# Patient Record
Sex: FEMALE | URBAN - METROPOLITAN AREA
[De-identification: names, ages, dates, MRNs, and addresses within clinical notes are randomized per-mention and may not be internally consistent; named-entity substitution may affect disease eponyms.]

---

## 2017-05-03 ENCOUNTER — RECORDS - HEALTHEAST (OUTPATIENT)
Dept: LAB | Facility: CLINIC | Age: 82
End: 2017-05-03

## 2017-05-04 LAB
ALT SERPL W P-5'-P-CCNC: 42 U/L (ref 0–45)
AST SERPL W P-5'-P-CCNC: 36 U/L (ref 0–40)

## 2017-10-20 ENCOUNTER — AMBULATORY - HEALTHEAST (OUTPATIENT)
Dept: ADMINISTRATIVE | Facility: CLINIC | Age: 82
End: 2017-10-20

## 2017-10-20 RX ORDER — PREDNISONE 10 MG/1
10 TABLET ORAL DAILY
Status: SHIPPED | COMMUNITY
Start: 2017-10-20

## 2017-10-20 RX ORDER — FUROSEMIDE 20 MG
60 TABLET ORAL EVERY MORNING
Status: SHIPPED | COMMUNITY
Start: 2017-10-20

## 2017-10-20 RX ORDER — FUROSEMIDE 40 MG
40 TABLET ORAL DAILY
Status: SHIPPED | COMMUNITY
Start: 2017-10-20

## 2017-10-20 RX ORDER — ACETAMINOPHEN 500 MG
1000 TABLET ORAL 3 TIMES DAILY
Status: SHIPPED | COMMUNITY
Start: 2017-10-20

## 2017-10-20 RX ORDER — POLYETHYLENE GLYCOL 3350 17 G/17G
17 POWDER, FOR SOLUTION ORAL DAILY
Status: SHIPPED | COMMUNITY
Start: 2017-10-20

## 2017-10-20 RX ORDER — POTASSIUM CHLORIDE 1500 MG/1
20 TABLET, EXTENDED RELEASE ORAL 2 TIMES DAILY
Status: SHIPPED | COMMUNITY
Start: 2017-10-20

## 2017-10-20 RX ORDER — METOPROLOL SUCCINATE 100 MG/1
250 TABLET, EXTENDED RELEASE ORAL DAILY
Status: SHIPPED | COMMUNITY
Start: 2017-10-20

## 2017-10-20 RX ORDER — OXYCODONE HYDROCHLORIDE 5 MG/1
2.5-5 TABLET ORAL EVERY 4 HOURS
Status: SHIPPED | COMMUNITY
Start: 2017-10-20

## 2017-10-24 ENCOUNTER — OFFICE VISIT - HEALTHEAST (OUTPATIENT)
Dept: GERIATRICS | Facility: CLINIC | Age: 82
End: 2017-10-24

## 2017-10-24 DIAGNOSIS — Z86.2 HX OF LEUKOCYTOSIS: ICD-10-CM

## 2017-10-24 DIAGNOSIS — I50.9 CHF (CONGESTIVE HEART FAILURE) (H): ICD-10-CM

## 2017-10-24 DIAGNOSIS — M06.9 RA (RHEUMATOID ARTHRITIS) (H): ICD-10-CM

## 2017-10-24 DIAGNOSIS — S32.10XA SACRAL FRACTURE (H): ICD-10-CM

## 2017-10-24 DIAGNOSIS — E46 PROTEIN MALNUTRITION (H): ICD-10-CM

## 2017-10-24 DIAGNOSIS — I10 HYPERTENSION: ICD-10-CM

## 2017-10-24 DIAGNOSIS — I48.91 A-FIB (H): ICD-10-CM

## 2017-10-25 ENCOUNTER — OFFICE VISIT - HEALTHEAST (OUTPATIENT)
Dept: GERIATRICS | Facility: CLINIC | Age: 82
End: 2017-10-25

## 2017-10-25 DIAGNOSIS — I48.91 A-FIB (H): ICD-10-CM

## 2017-10-25 DIAGNOSIS — M35.3 PMR (POLYMYALGIA RHEUMATICA) (H): ICD-10-CM

## 2017-10-25 DIAGNOSIS — J84.9 INTERSTITIAL LUNG DISEASE (H): ICD-10-CM

## 2017-10-25 DIAGNOSIS — M06.9 RA (RHEUMATOID ARTHRITIS) (H): ICD-10-CM

## 2017-10-25 DIAGNOSIS — S32.10XA SACRAL FRACTURE (H): ICD-10-CM

## 2017-10-27 ENCOUNTER — OFFICE VISIT - HEALTHEAST (OUTPATIENT)
Dept: GERIATRICS | Facility: CLINIC | Age: 82
End: 2017-10-27

## 2017-10-27 DIAGNOSIS — S32.10XA SACRAL FRACTURE (H): ICD-10-CM

## 2017-10-27 DIAGNOSIS — I48.91 A-FIB (H): ICD-10-CM

## 2017-10-27 DIAGNOSIS — I10 HYPERTENSION: ICD-10-CM

## 2017-10-27 DIAGNOSIS — I50.9 CHF (CONGESTIVE HEART FAILURE) (H): ICD-10-CM

## 2017-10-31 ENCOUNTER — OFFICE VISIT - HEALTHEAST (OUTPATIENT)
Dept: GERIATRICS | Facility: CLINIC | Age: 82
End: 2017-10-31

## 2017-10-31 DIAGNOSIS — I10 HYPERTENSION: ICD-10-CM

## 2017-10-31 DIAGNOSIS — E46 PROTEIN MALNUTRITION (H): ICD-10-CM

## 2017-10-31 DIAGNOSIS — R52 PAIN MANAGEMENT: ICD-10-CM

## 2017-10-31 DIAGNOSIS — S32.10XA SACRAL FRACTURE (H): ICD-10-CM

## 2017-11-02 ENCOUNTER — OFFICE VISIT - HEALTHEAST (OUTPATIENT)
Dept: GERIATRICS | Facility: CLINIC | Age: 82
End: 2017-11-02

## 2017-11-02 DIAGNOSIS — I10 HYPERTENSION: ICD-10-CM

## 2017-11-02 DIAGNOSIS — R52 PAIN MANAGEMENT: ICD-10-CM

## 2017-11-02 DIAGNOSIS — M06.9 RA (RHEUMATOID ARTHRITIS) (H): ICD-10-CM

## 2017-11-02 DIAGNOSIS — S32.10XA SACRAL FRACTURE (H): ICD-10-CM

## 2017-11-07 ENCOUNTER — OFFICE VISIT - HEALTHEAST (OUTPATIENT)
Dept: GERIATRICS | Facility: CLINIC | Age: 82
End: 2017-11-07

## 2017-11-07 DIAGNOSIS — R26.9 GAIT DIFFICULTY: ICD-10-CM

## 2017-11-07 DIAGNOSIS — E46 PROTEIN MALNUTRITION (H): ICD-10-CM

## 2017-11-07 DIAGNOSIS — S32.10XA SACRAL FRACTURE (H): ICD-10-CM

## 2017-11-07 DIAGNOSIS — I10 HYPERTENSION: ICD-10-CM

## 2017-11-10 ENCOUNTER — OFFICE VISIT - HEALTHEAST (OUTPATIENT)
Dept: GERIATRICS | Facility: CLINIC | Age: 82
End: 2017-11-10

## 2017-11-10 DIAGNOSIS — S32.10XA SACRAL FRACTURE (H): ICD-10-CM

## 2017-11-10 DIAGNOSIS — R26.9 GAIT DIFFICULTY: ICD-10-CM

## 2017-11-10 DIAGNOSIS — R60.0 LEG EDEMA: ICD-10-CM

## 2017-11-10 DIAGNOSIS — I10 HYPERTENSION: ICD-10-CM

## 2017-11-21 ENCOUNTER — OFFICE VISIT - HEALTHEAST (OUTPATIENT)
Dept: GERIATRICS | Facility: CLINIC | Age: 82
End: 2017-11-21

## 2017-11-21 ENCOUNTER — AMBULATORY - HEALTHEAST (OUTPATIENT)
Dept: ADMINISTRATIVE | Facility: CLINIC | Age: 82
End: 2017-11-21

## 2017-11-21 DIAGNOSIS — M35.3 PMR (POLYMYALGIA RHEUMATICA) (H): ICD-10-CM

## 2017-11-21 DIAGNOSIS — S32.10XA SACRAL FRACTURE (H): ICD-10-CM

## 2017-11-21 DIAGNOSIS — J84.9 INTERSTITIAL LUNG DISEASE (H): ICD-10-CM

## 2017-11-21 DIAGNOSIS — I48.91 A-FIB (H): ICD-10-CM

## 2017-11-21 DIAGNOSIS — M06.9 RA (RHEUMATOID ARTHRITIS) (H): ICD-10-CM

## 2017-11-29 ENCOUNTER — AMBULATORY - HEALTHEAST (OUTPATIENT)
Dept: GERIATRICS | Facility: CLINIC | Age: 82
End: 2017-11-29

## 2021-06-13 NOTE — PROGRESS NOTES
[facility]    Facility:   [facility] [[id number]]   Code Status: DNR      CHIEF COMPLAINT/REASON FOR VISIT:  Chief Complaint   Patient presents with     Problem Visit     asked to see       HISTORY:      HPI: Gladys is a 87 y.o. female who I had the pleasure to visit with pulse was asked to see secondary to recent hospitalization October 11 - October 18, 2017 secondary to a number of concerns.  She did have a fall there was a concern for a syncopal episode.  She thought that she tripped and fell but cannot remember the exact circumstances because it happened very quickly.  Did not lose consciousness.  Was asymptomatic.  Was found by the EMS to be hypotensive systolic blood pressures in the 80s.  She did receive IV fluids.  The x-rays did show a left sacral fracture along with pubic rami fractures.  The head CT was negative for hemorrhage.  Neurosurgery was consulted.  The urinary retention was resolved.  Orthopedics was consulted.  For pain she was started on Tylenol thousand milligrams 3 times a day and oxycodone as needed.  She is also found to have protein malnutrition albumin of 2.4 nutritionist was consulted.  She does have a history of chronic leukocytosis likely due to the steroids secondary to her rheumatoid arthritis.  While in the hospital she was put on IV fluids there was a concern about fluid volume overload.  Cardiology was consulted.  Therefore did have a mild exacerbation of diastolic congestive heart failure.  She was diuresed with furosemide and oxygen as needed.  She also has a history of permanent atrial fibrillation she is on Coumadin.  Her diltiazem was discontinued and was switched to Toprol- mg she was on metoprolol succinate 100 mg twice daily.  She currently is enrolled in the therapy program.  She does have her HELENE hose on.  She is feeling much better the energy is improving.  She still has some difficulty with ambulation as well as  with pain issues.  Again she is on the Tylenol 3 times a day at thousand milligrams and for the oxycodone she does not feel it is enough the staff do agree so we will give her the 5 mg rather than 2.5 mg of oxycodone but schedule it every 4 hours and then extra as needed and she only wants to be on it for short period of time and hopefully by next week we can use all the oxycodone as needed.  We will recheck the BMP later this week just due to the CKD and being on diuretics.  We will also try to give her an extra protein supplements in the form of boost for her nutritional status.  Past Medical History:   Diagnosis Date     Atrial fibrillation      Fracture of multiple pubic rami      HTN (hypertension)      Interstitial lung disease      Osteoporosis      PMR (polymyalgia rheumatica)      RA (rheumatoid arthritis)      Sacral fracture              Family History   Problem Relation Age of Onset     Melanoma Mother      Cataracts Mother      Hypertension Father      Glaucoma Neg Hx      Macular degeneration Neg Hx      Retinal detachment Neg Hx      Social History     Social History     Marital status:      Spouse name: N/A     Number of children: N/A     Years of education: N/A     Social History Main Topics     Smoking status: Never Smoker     Smokeless tobacco: Never Used     Alcohol use Yes     Drug use: No     Sexual activity: Yes     Partners: Male     Other Topics Concern     Not on file     Social History Narrative     No narrative on file         Review of Systems  Currently she does deny chills fever coughing wheezing chest pain dizziness vertigo nausea vomiting diarrhea dysuria rashes or sores.  A history of diastolic CHF A. fib hypertension protein malnutrition chronic leukocytosis rheumatoid arthritis      Current Outpatient Prescriptions:      acetaminophen (TYLENOL) 500 MG tablet, Take 1,000 mg by mouth 3 (three) times a day., Disp: , Rfl:      furosemide (LASIX) 20 MG tablet, Take 60 mg by  mouth every morning., Disp: , Rfl:      furosemide (LASIX) 40 MG tablet, Take 40 mg by mouth daily. Afternoon, Disp: , Rfl:      metoprolol succinate (TOPROL-XL) 100 MG 24 hr tablet, Take 250 mg by mouth daily., Disp: , Rfl:      oxyCODONE (ROXICODONE) 5 MG immediate release tablet, Take 5 mg by mouth every 4 (four) hours. , Disp: , Rfl:      polyethylene glycol (MIRALAX) 17 gram packet, Take 17 g by mouth daily., Disp: , Rfl:      potassium chloride SA (K-DUR,KLOR-CON) 20 MEQ tablet, Take 20 mEq by mouth 2 (two) times a day., Disp: , Rfl:      predniSONE (DELTASONE) 10 mg tablet, Take 10 mg by mouth daily., Disp: , Rfl:      senna (SENOKOT) 8.6 mg tablet, Take 1 tablet by mouth daily., Disp: , Rfl:      WARFARIN SODIUM (WARFARIN ORAL), Take by mouth. 4 mg every Mon, Wed, Fri. 3 mg all other days. Recheck INR 10/26, Disp: , Rfl:     .There were no vitals filed for this visit.  Blood pressure 110/60 pulse 94 respirations 18 temperature 96.9 saturation room air 98%  Physical Exam   Constitutional: She is oriented to person, place, and time. She appears well-nourished. No distress.   HENT:   Head: Normocephalic.   Eyes: Pupils are equal, round, and reactive to light.   Glasses.   Neck: Neck supple. No thyromegaly present.   Cardiovascular:   Irregularity.  HELENE hose.  No significant edema.   Pulmonary/Chest: Breath sounds normal.   Abdominal: Bowel sounds are normal. There is no tenderness. There is no guarding.   Musculoskeletal:   History of rheumatoid arthritis.  Multiple joints.  Including the hands.  Left sacral fracture.   Lymphadenopathy:     She has no cervical adenopathy.   Neurological: She is oriented to person, place, and time.   Skin: Skin is warm and dry. No rash noted.         LABS:   Lab Results   Component Value Date    WBC 16.3 (H) 07/10/2017    HGB 13.9 07/10/2017    HCT 44.0 07/10/2017    MCV 93 07/10/2017     07/10/2017     Results for orders placed or performed in visit on 07/12/17   Basic  Metabolic Panel   Result Value Ref Range    Sodium 141 136 - 145 mmol/L    Potassium 3.0 (L) 3.5 - 5.0 mmol/L    Chloride 101 98 - 107 mmol/L    CO2 31 22 - 31 mmol/L    Anion Gap, Calculation 9 5 - 18 mmol/L    Glucose 51 (LL) 70 - 125 mg/dL    Calcium 8.4 (L) 8.5 - 10.5 mg/dL    BUN 25 8 - 28 mg/dL    Creatinine 0.75 0.60 - 1.10 mg/dL    GFR MDRD Af Amer >60 >60 mL/min/1.73m2    GFR MDRD Non Af Amer >60 >60 mL/min/1.73m2           ASSESSMENT:      ICD-10-CM    1. Sacral fracture S32.10XA    2. CHF (congestive heart failure) I50.9    3. Hypertension I10    4. Protein malnutrition E46    5. RA (rheumatoid arthritis) M06.9    6. A-fib I48.91    7. Hx of leukocytosis Z86.2        PLAN:    We will recheck the BMP later on this week on the 26th is due to being on the diuretics as well as having history of CHF.  She also is on Coumadin.  Monitor the blood pressures as well as her overall status.  Also increase the oxycodone to 5 mg every 4 hours and extra as needed.  Get her extra house nutrient supplements and protein drinks.  She agrees with the current plan of care.    For documentation purposes total visit was over 40 minutes to which over 50% was spent with the patient going over her care her medications to stay on the transitional care unit and coordination of care efforts.    .    Electronically signed by: Michael Duane Johnson, CNP

## 2021-06-13 NOTE — PROGRESS NOTES
[facility]      Facility:    [facility] [[id number]]  Code Status: DNR/DNI       Chief Complaint/Reason for Visit:  Chief Complaint   Patient presents with     H & P     New admit to TCU-fall, sacral and pubic fractures, afib, ILD, CHF, RA. (H & P 10/25/17).       HPI:   Gladys is a 87 y.o. female with hx of PMR and seropositive RA on chronic steroids, interstitial lung disease, afib on coumadin, HTN and osteoporosis who presented to [facility] ED on 10/11/17 after a fall. Brought to hospital by EMS with BPs reportedly 80s systolic. Concern for syncope. Also noted hypoxia in the ED. She complained of left sided hip and pelvic pain. Imaging revealed left sided sacral fracture and pubic rami fractures. Head CT neg. Noted leukocytosis  with wbc 20K. No symptoms of infection, favored to be secondary to chronic steroids. She had rapid Afib, med's adjusted. She had respiratory failure and fluid overload, required oxygen and diuresis. Mild DANIEL which improved. Overall stabilized and discharged to TCU on 10/19/17 for PT, OT, nursing cares, medical management and monitoring.       Today:  Pain in left pelvis, slowly improving. Meds adjusted, she is now on scheduled oxycodone. No pain at rest, hurts to weight bear and do therapy. Denies palpitations, shortness of breath or chest pain. No dizziness. Appetite is fair. No diarrhea or constipation. No new vision or hearing problems.       Past Medical History:  Past Medical History:   Diagnosis Date     Atrial fibrillation      Fracture of multiple pubic rami      HTN (hypertension)      Interstitial lung disease      Osteoporosis      PMR (polymyalgia rheumatica)      RA (rheumatoid arthritis)      Sacral fracture            Surgical History:  Past Surgical History:   Procedure Laterality Date     CATARACT EXTRACTION Bilateral      MOHS SURGERY       TOTAL KNEE ARTHROPLASTY         Family History:   Family History   Problem  Relation Age of Onset     Melanoma Mother      Cataracts Mother      Hypertension Father      Glaucoma Neg Hx      Macular degeneration Neg Hx      Retinal detachment Neg Hx        Social History:    Social History     Social History     Marital status:      Spouse name: N/A     Number of children: N/A     Years of education: N/A     Social History Main Topics     Smoking status: Never Smoker     Smokeless tobacco: Never Used     Alcohol use Yes     Drug use: No     Sexual activity: Yes     Partners: Male     Other Topics Concern     Not on file     Social History Narrative     No narrative on file       Medications:  Current Outpatient Prescriptions   Medication Sig     acetaminophen (TYLENOL) 500 MG tablet Take 1,000 mg by mouth 3 (three) times a day.     furosemide (LASIX) 20 MG tablet Take 60 mg by mouth every morning.     furosemide (LASIX) 40 MG tablet Take 40 mg by mouth daily. Afternoon     metoprolol succinate (TOPROL-XL) 100 MG 24 hr tablet Take 250 mg by mouth daily.     oxyCODONE (ROXICODONE) 5 MG immediate release tablet Take 5 mg by mouth every 4 (four) hours.      polyethylene glycol (MIRALAX) 17 gram packet Take 17 g by mouth daily.     potassium chloride SA (K-DUR,KLOR-CON) 20 MEQ tablet Take 20 mEq by mouth 2 (two) times a day.     predniSONE (DELTASONE) 10 mg tablet Take 10 mg by mouth daily.     senna (SENOKOT) 8.6 mg tablet Take 1 tablet by mouth daily.     WARFARIN SODIUM (WARFARIN ORAL) Take by mouth. 4 mg every Mon, Wed, Fri. 3 mg all other days. Recheck INR 10/26        Allergies:  Allergies   Allergen Reactions     Hydroxychloroquine      Sulfa (Sulfonamide Antibiotics)        Review of Systems:  Pertinent items are noted in HPI.      Physical Exam:   General: Patient is alert, pleasant elderly female, no distress.  Vitals: /80, Temp 97, Pulse 76, RR 18, O2 sat 98% RA.  HEENT: Head is NCAT. Eyes show no injection or icterus. Nares negative. Oropharynx well hydrated.  Neck:  Supple. No tenderness or adenopathy. No JVD.  Lungs: Clear bilaterally. No wheezes.  Cardiovascular: Irregular.  Back: No spinal or CVA tenderness.  Abdomen: Soft, no tenderness on exam. Bowel sounds present. No guarding rebound or rigidity.  : Deferred.  Extremities: Min edema is noted.  Musculoskeletal: Age related degen changes.   Skin: Warm and dry.   Psych: Mood appears good.      Assessment/Plan:  1. Left sacral and pubic fractures. WBAT. Encourage therapy. Pain management. Follow up with ortho.  2. Afib. Anticoagulated with coumadin. Metoprolol for rate control, issues with rapid rate though BPs low side at times. Watch closely, may need further med adjustments.   3. Interstitial lung disease. Not on oxygen. No complaints of SOB.  4. RA and PMR. On chronic prednisone.  5. Volume overload in the hospital with hx diastolic HF. Cont diuretics.   6. Code status is DNR/DNI.      Total time greater than 60 minutes, greater than 50% counseling and coordination of care, time spent in interview and examination of patient, review of records, discussion with nursing staff.      Electronically signed by: Lala Silva MD

## 2021-06-13 NOTE — PROGRESS NOTES
[facility]    Facility:   [facility] [[id number]]   Code Status: DNR      CHIEF COMPLAINT/REASON FOR VISIT:  Chief Complaint   Patient presents with     Follow Up     sacral fx       HISTORY:      HPI: Gladys is a 87 y.o. female who I had the pleasure to visit with secondary to her hospitalization October 11 - October 18, 2017 secondary to a fall and sustaining a left sacral fracture and a pubic ramus fracture.  She did have x-rays earlier this week and it did show a nonhealed left pubic symphysis superior and inferior fracture.  She does see orthopedics tomorrow.  She is ambulating with a walker and with therapy otherwise is starting to use a wheelchair.  Her pain is well managed she wants to be on the oxycodone 5 mg every 4 hours with Tylenol thousand milligrams 3 times a day.  Getting extra house nutrient supplements on a regular diet.  She has been normotensive and afebrile.  Her weight is stable at 138 pounds.  Bowels are regular.    Past Medical History:   Diagnosis Date     Atrial fibrillation      Fracture of multiple pubic rami      HTN (hypertension)      Interstitial lung disease      Osteoporosis      PMR (polymyalgia rheumatica)      RA (rheumatoid arthritis)      Sacral fracture              Family History   Problem Relation Age of Onset     Melanoma Mother      Cataracts Mother      Hypertension Father      Glaucoma Neg Hx      Macular degeneration Neg Hx      Retinal detachment Neg Hx      Social History     Social History     Marital status:      Spouse name: N/A     Number of children: N/A     Years of education: N/A     Social History Main Topics     Smoking status: Never Smoker     Smokeless tobacco: Never Used     Alcohol use Yes     Drug use: No     Sexual activity: Yes     Partners: Male     Other Topics Concern     Not on file     Social History Narrative     No narrative on file         Review of Systems  Currently she does deny chills  fever coughing wheezing chest pain dizziness vertigo nausea vomiting diarrhea dysuria rashes or sores.  A history of diastolic CHF A. fib hypertension protein malnutrition chronic leukocytosis rheumatoid arthritis          Current Outpatient Prescriptions:      acetaminophen (TYLENOL) 500 MG tablet, Take 1,000 mg by mouth 3 (three) times a day., Disp: , Rfl:      furosemide (LASIX) 20 MG tablet, Take 60 mg by mouth every morning., Disp: , Rfl:      furosemide (LASIX) 40 MG tablet, Take 40 mg by mouth daily. Afternoon, Disp: , Rfl:      metoprolol succinate (TOPROL-XL) 100 MG 24 hr tablet, Take 250 mg by mouth daily., Disp: , Rfl:      oxyCODONE (ROXICODONE) 5 MG immediate release tablet, Take 5 mg by mouth every 4 (four) hours. , Disp: , Rfl:      polyethylene glycol (MIRALAX) 17 gram packet, Take 17 g by mouth daily., Disp: , Rfl:      potassium chloride SA (K-DUR,KLOR-CON) 20 MEQ tablet, Take 20 mEq by mouth 2 (two) times a day., Disp: , Rfl:      predniSONE (DELTASONE) 10 mg tablet, Take 10 mg by mouth daily., Disp: , Rfl:      senna (SENOKOT) 8.6 mg tablet, Take 1 tablet by mouth daily., Disp: , Rfl:      WARFARIN SODIUM (WARFARIN ORAL), Take by mouth. 4 mg every Mon, Wed, Fri. 3 mg all other days. Recheck INR 10/26, Disp: , Rfl:   .There were no vitals filed for this visit.  Blood pressure 118/80 pulse 89 respirations 18 temperature 96.0  Physical Exam   Constitutional: She is oriented to person, place, and time. She appears well-nourished. No distress.   HENT:   Head: Normocephalic.   Eyes:   Glasses.   Neck: Neck supple. No thyromegaly present.   Cardiovascular:   Irregularity.  HELENE hose.  No significant edema.   Pulmonary/Chest: Breath sounds normal.   Abdominal:  There is no tenderness. There is no guarding.   Musculoskeletal:   History of rheumatoid arthritis.  Multiple joints.  Including the hands.  Left sacral fracture.   Lymphadenopathy:     She has no cervical adenopathy.   Neurological: She is  oriented to person, place, and time.   Skin: Skin is warm and dry. No rash noted.        LABS:   Lab Results   Component Value Date    WBC 16.3 (H) 07/10/2017    HGB 13.9 07/10/2017    HCT 44.0 07/10/2017    MCV 93 07/10/2017     07/10/2017     Results for orders placed or performed in visit on 10/26/17   Basic Metabolic Panel   Result Value Ref Range    Sodium 143 136 - 145 mmol/L    Potassium 4.1 3.5 - 5.0 mmol/L    Chloride 105 98 - 107 mmol/L    CO2 27 22 - 31 mmol/L    Anion Gap, Calculation 11 5 - 18 mmol/L    Glucose 69 (L) 70 - 125 mg/dL    Calcium 8.9 8.5 - 10.5 mg/dL    BUN 31 (H) 8 - 28 mg/dL    Creatinine 0.99 0.60 - 1.10 mg/dL    GFR MDRD Af Amer >60 >60 mL/min/1.73m2    GFR MDRD Non Af Amer 53 (L) >60 mL/min/1.73m2           ASSESSMENT:      ICD-10-CM    1. Sacral fracture S32.10XA    2. RA (rheumatoid arthritis) M06.9    3. Hypertension I10    4. Pain management R52        PLAN:    At this time continue to manage and follow.  Does see orthopedics tomorrow.  Pain is managed.        Electronically signed by: Michael Duane Johnson, CNP

## 2021-06-13 NOTE — PROGRESS NOTES
[facility]    Facility:   [facility] [[id number]]   Code Status: DNR      CHIEF COMPLAINT/REASON FOR VISIT:  Chief Complaint   Patient presents with     Follow Up     sacral fx       HISTORY:      HPI: Gladys is a 87 y.o. female who I had the pleasure to follow-up secondary to her hospitalization October 11 - October 18, 2017 secondary to a fall and did sustain left sacral fracture along with a pubic ramus fracture.  Did have x-rays performed yesterday and it really non-healed right pubic symphysis superior and inferior fractures.  She is still ambulating she is actually able to ambulate quite well up and down the hallway.  She does feel her pain to be managed does have Tylenol thousand milligrams 3 times a day oxycodone 5 mg every 4 hours and she wants to stay with this plan at this time but otherwise still sore on the bottom but otherwise is able to participate in therapy.  Does have CHF is on furosemide.  She has been normotensive and afebrile.  Is on a regular diet and getting extra house nutrient supplements.  She does have RA but it is not inhibiting or impinging upon her therapy.    Past Medical History:   Diagnosis Date     Atrial fibrillation      Fracture of multiple pubic rami      HTN (hypertension)      Interstitial lung disease      Osteoporosis      PMR (polymyalgia rheumatica)      RA (rheumatoid arthritis)      Sacral fracture              Family History   Problem Relation Age of Onset     Melanoma Mother      Cataracts Mother      Hypertension Father      Glaucoma Neg Hx      Macular degeneration Neg Hx      Retinal detachment Neg Hx      Social History     Social History     Marital status:      Spouse name: N/A     Number of children: N/A     Years of education: N/A     Social History Main Topics     Smoking status: Never Smoker     Smokeless tobacco: Never Used     Alcohol use Yes     Drug use: No     Sexual activity: Yes     Partners: Male      Other Topics Concern     Not on file     Social History Narrative     No narrative on file         Review of Systems  Currently she does deny chills fever coughing wheezing chest pain dizziness vertigo nausea vomiting diarrhea dysuria rashes or sores.  A history of diastolic CHF A. fib hypertension protein malnutrition chronic leukocytosis rheumatoid arthritis          Current Outpatient Prescriptions:      acetaminophen (TYLENOL) 500 MG tablet, Take 1,000 mg by mouth 3 (three) times a day., Disp: , Rfl:      furosemide (LASIX) 20 MG tablet, Take 60 mg by mouth every morning., Disp: , Rfl:      furosemide (LASIX) 40 MG tablet, Take 40 mg by mouth daily. Afternoon, Disp: , Rfl:      metoprolol succinate (TOPROL-XL) 100 MG 24 hr tablet, Take 250 mg by mouth daily., Disp: , Rfl:      oxyCODONE (ROXICODONE) 5 MG immediate release tablet, Take 5 mg by mouth every 4 (four) hours. , Disp: , Rfl:      polyethylene glycol (MIRALAX) 17 gram packet, Take 17 g by mouth daily., Disp: , Rfl:      potassium chloride SA (K-DUR,KLOR-CON) 20 MEQ tablet, Take 20 mEq by mouth 2 (two) times a day., Disp: , Rfl:      predniSONE (DELTASONE) 10 mg tablet, Take 10 mg by mouth daily., Disp: , Rfl:      senna (SENOKOT) 8.6 mg tablet, Take 1 tablet by mouth daily., Disp: , Rfl:      WARFARIN SODIUM (WARFARIN ORAL), Take by mouth. 4 mg every Mon, Wed, Fri. 3 mg all other days. Recheck INR 10/26, Disp: , Rfl:   .There were no vitals filed for this visit.  Blood pressure 110/80 pulse 77 respirations 20 temperature 97.2  Physical Exam   Constitutional: She is oriented to person, place, and time. She appears well-nourished. No distress.   HENT:   Head: Normocephalic.   Eyes:   Glasses.   Neck: Neck supple. No thyromegaly present.   Cardiovascular:   Irregularity.  HELENE hose.  No significant edema.   Pulmonary/Chest: Breath sounds normal.   Abdominal: Bowel sounds are normal. There is no tenderness. There is no guarding.   Musculoskeletal:    History of rheumatoid arthritis.  Multiple joints.  Including the hands.  Left sacral fracture.   Lymphadenopathy:     She has no cervical adenopathy.   Neurological: She is oriented to person, place, and time.   Skin: Skin is warm and dry. No rash noted.      LABS:   Lab Results   Component Value Date    WBC 16.3 (H) 07/10/2017    HGB 13.9 07/10/2017    HCT 44.0 07/10/2017    MCV 93 07/10/2017     07/10/2017     Results for orders placed or performed in visit on 10/26/17   Basic Metabolic Panel   Result Value Ref Range    Sodium 143 136 - 145 mmol/L    Potassium 4.1 3.5 - 5.0 mmol/L    Chloride 105 98 - 107 mmol/L    CO2 27 22 - 31 mmol/L    Anion Gap, Calculation 11 5 - 18 mmol/L    Glucose 69 (L) 70 - 125 mg/dL    Calcium 8.9 8.5 - 10.5 mg/dL    BUN 31 (H) 8 - 28 mg/dL    Creatinine 0.99 0.60 - 1.10 mg/dL    GFR MDRD Af Amer >60 >60 mL/min/1.73m2    GFR MDRD Non Af Amer 53 (L) >60 mL/min/1.73m2           ASSESSMENT:      ICD-10-CM    1. Sacral fracture S32.10XA    2. Pain management R52    3. Hypertension I10    4. Protein malnutrition E46        PLAN:    Recently did have a BMP and labs and overall seems to be in pretty good shape overall.  Continue to manage and follow continue to work with therapy she does have a good attitude finish up therapy program.  .    Electronically signed by: Michael Duane Johnson, CNP

## 2021-06-13 NOTE — PROGRESS NOTES
[facility]    Facility:   [facility] [[id number]]   Code Status: DNR      CHIEF COMPLAINT/REASON FOR VISIT:  Chief Complaint   Patient presents with     Follow Up     sacral fx       HISTORY:      HPI: Gladys is a 87 y.o. female who I had the pleasure to visit with secondary to hospitalization October 11 - October 18, 2017.  There was a fall concern for syncopal episode.  She was hypotensive.  X-rays did show a left sacral fracture along with a pubic ramus fracture.  Head CT was negative for hemorrhage.  She currently is on the transitional care unit try to improve her strength and balance and conditioning.  She is having pretty good success so far she is able to use a walker and ambulate up and down the hallway.  She still has some left buttock discomfort but that is to be expected at this time but it is not hindering her therapy.  She is on oxycodone 5 mg every 4 hours for pain which has been helpful we change that earlier this week when it was just as needed she has not required any extra as as needed.  She is also on Tylenol thousand milligrams 3 times a day.  She has been normotensive and afebrile.  Her bowels are regular.  No CHF issues.  Normotensive and afebrile.  Is getting extra protein drinks.    Past Medical History:   Diagnosis Date     Atrial fibrillation      Fracture of multiple pubic rami      HTN (hypertension)      Interstitial lung disease      Osteoporosis      PMR (polymyalgia rheumatica)      RA (rheumatoid arthritis)      Sacral fracture              Family History   Problem Relation Age of Onset     Melanoma Mother      Cataracts Mother      Hypertension Father      Glaucoma Neg Hx      Macular degeneration Neg Hx      Retinal detachment Neg Hx      Social History     Social History     Marital status:      Spouse name: N/A     Number of children: N/A     Years of education: N/A     Social History Main Topics     Smoking status: Never  Smoker     Smokeless tobacco: Never Used     Alcohol use Yes     Drug use: No     Sexual activity: Yes     Partners: Male     Other Topics Concern     Not on file     Social History Narrative     No narrative on file         Review of Systems  Currently she does deny chills fever coughing wheezing chest pain dizziness vertigo nausea vomiting diarrhea dysuria rashes or sores.  A history of diastolic CHF A. fib hypertension protein malnutrition chronic leukocytosis rheumatoid arthritis        Current Outpatient Prescriptions:      acetaminophen (TYLENOL) 500 MG tablet, Take 1,000 mg by mouth 3 (three) times a day., Disp: , Rfl:      furosemide (LASIX) 20 MG tablet, Take 60 mg by mouth every morning., Disp: , Rfl:      furosemide (LASIX) 40 MG tablet, Take 40 mg by mouth daily. Afternoon, Disp: , Rfl:      metoprolol succinate (TOPROL-XL) 100 MG 24 hr tablet, Take 250 mg by mouth daily., Disp: , Rfl:      oxyCODONE (ROXICODONE) 5 MG immediate release tablet, Take 5 mg by mouth every 4 (four) hours. , Disp: , Rfl:      polyethylene glycol (MIRALAX) 17 gram packet, Take 17 g by mouth daily., Disp: , Rfl:      potassium chloride SA (K-DUR,KLOR-CON) 20 MEQ tablet, Take 20 mEq by mouth 2 (two) times a day., Disp: , Rfl:      predniSONE (DELTASONE) 10 mg tablet, Take 10 mg by mouth daily., Disp: , Rfl:      senna (SENOKOT) 8.6 mg tablet, Take 1 tablet by mouth daily., Disp: , Rfl:      WARFARIN SODIUM (WARFARIN ORAL), Take by mouth. 4 mg every Mon, Wed, Fri. 3 mg all other days. Recheck INR 10/26, Disp: , Rfl:   .There were no vitals filed for this visit.  Blood pressure 110/60 pulse 78 respirations 16 temperature 97.2  Physical Exam  Constitutional: She is oriented to person, place, and time. She appears well-nourished. No distress.   HENT:   Head: Normocephalic.   Eyes:   Glasses.   Neck: Neck supple. No thyromegaly present.   Cardiovascular:   Irregularity.  HELENE hose.  No significant edema.   Pulmonary/Chest: Breath  sounds normal.   Abdominal: Bowel sounds are normal. There is no tenderness. There is no guarding.   Musculoskeletal:   History of rheumatoid arthritis.  Multiple joints.  Including the hands.  Left sacral fracture.   Lymphadenopathy:     She has no cervical adenopathy.   Neurological: She is oriented to person, place, and time.   Skin: Skin is warm and dry. No rash noted.     LABS:   .  Lab Results   Component Value Date    WBC 16.3 (H) 07/10/2017    HGB 13.9 07/10/2017    HCT 44.0 07/10/2017    MCV 93 07/10/2017     07/10/2017         ASSESSMENT:      ICD-10-CM    1. Sacral fracture S32.10XA    2. Hypertension I10    3. A-fib I48.91    4. CHF (congestive heart failure) I50.9        PLAN:    At this time continue with therapy and monitoring of her chronic medical conditions.  She is making pretty good progress overall and at this point does not have any particular questions or concerns.    .    Electronically signed by: Michael Duane Johnson, CNP

## 2021-06-14 NOTE — PROGRESS NOTES
[facility]      Facility:    [facility] [[id number]]  Code Status: DNR/DNI       Chief Complaint/Reason for Visit:  Chief Complaint   Patient presents with     H & P     Re-admit to TCU after pelvic fracture surgery. (H & P 11/21/17).       HPI:   Gladys is a 87 y.o. female with hx of PMR and seropositive RA on chronic steroids, interstitial lung disease, afib on coumadin, HTN and osteoporosis who fell in October 2017 and sustained left sided sacral fracture and pubic rami fractures. She was residing in the TCU for rehabilitation but due to persistent pain and displacement of the fractures, she was electively admitted to the hospital on 11/14/17 for operative repair. No reported complications. No hospital issues. Coumadin had been held prior to procedure, restarted afterwards. She returned to TCU for additional rehabilitation.      Today:  She is WBAT. Still with significant pain. She states at this point she is not sure if surgery helped. But no pain at rest. Wishes for better pain management. Will schedule oxycodone 5 mg every 4 hours while awake with prn also. Denies palpitations, shortness of breath or chest pain. No dizziness. Appetite is fair. No diarrhea or constipation. No new vision or hearing problems.       Past Medical History:  Past Medical History:   Diagnosis Date     Atrial fibrillation      Fracture of multiple pubic rami      HTN (hypertension)      Interstitial lung disease      Osteoporosis      PMR (polymyalgia rheumatica)      RA (rheumatoid arthritis)      Sacral fracture            Surgical History:  Past Surgical History:   Procedure Laterality Date     CATARACT EXTRACTION Bilateral      CLOSED REDUCTION PERC PINNING OF SACRUM   11/14/2017     MOHS SURGERY       TOTAL KNEE ARTHROPLASTY         Family History:   Family History   Problem Relation Age of Onset     Melanoma Mother      Cataracts Mother      Hypertension Father      Glaucoma Neg Hx       Macular degeneration Neg Hx      Retinal detachment Neg Hx        Social History:    Social History     Social History     Marital status:      Spouse name: N/A     Number of children: N/A     Years of education: N/A     Social History Main Topics     Smoking status: Never Smoker     Smokeless tobacco: Never Used     Alcohol use Yes     Drug use: No     Sexual activity: Yes     Partners: Male     Other Topics Concern     Not on file     Social History Narrative       Medications:  Current Outpatient Prescriptions   Medication Sig     acetaminophen (TYLENOL) 500 MG tablet Take 1,000 mg by mouth 3 (three) times a day.     cholecalciferol, vitamin D3, 2,000 unit Tab Take 1 tablet by mouth daily.     furosemide (LASIX) 20 MG tablet Take 60 mg by mouth every morning.     furosemide (LASIX) 40 MG tablet Take 40 mg by mouth daily. Afternoon     metoprolol succinate (TOPROL-XL) 100 MG 24 hr tablet Take 250 mg by mouth daily.     oxyCODONE (ROXICODONE) 5 MG immediate release tablet Take 2.5-5 mg by mouth every 4 (four) hours.      polyethylene glycol (MIRALAX) 17 gram packet Take 17 g by mouth daily.     potassium chloride SA (K-DUR,KLOR-CON) 20 MEQ tablet Take 20 mEq by mouth 2 (two) times a day.     predniSONE (DELTASONE) 10 mg tablet Take 10 mg by mouth daily.     senna (SENOKOT) 8.6 mg tablet Take 1 tablet by mouth daily.     WARFARIN SODIUM (WARFARIN ORAL) Take by mouth. 11/21/17 3mg daily. Recheck INR 11/24/17  4 mg every Mon, Wed, Fri. 3 mg all other days. Recheck INR 10/26        Allergies:  Allergies   Allergen Reactions     Hydroxychloroquine      Sulfa (Sulfonamide Antibiotics)        Review of Systems:  Pertinent items are noted in HPI.      Physical Exam:   General: Patient is alert, pleasant elderly female, no distress.   Vitals: Reviewed.  HEENT: Head is NCAT. Eyes show no injection or icterus. Nares negative. Oropharynx well hydrated.  Neck: Supple. No tenderness or adenopathy. No JVD.  Lungs:  Clear bilaterally. No wheezes.  Cardiovascular: Irregular.  Back: No spinal or CVA tenderness.  Abdomen: Soft, no tenderness on exam. Bowel sounds present. No guarding rebound or rigidity.  : Deferred.  Extremities: Mild left LE swelling.   Musculoskeletal: Age related degen changes.   Skin: Scattered bruises.   Psych: Mood appears good.      Labs:  Results for orders placed or performed in visit on 10/26/17   Basic Metabolic Panel   Result Value Ref Range    Sodium 143 136 - 145 mmol/L    Potassium 4.1 3.5 - 5.0 mmol/L    Chloride 105 98 - 107 mmol/L    CO2 27 22 - 31 mmol/L    Anion Gap, Calculation 11 5 - 18 mmol/L    Glucose 69 (L) 70 - 125 mg/dL    Calcium 8.9 8.5 - 10.5 mg/dL    BUN 31 (H) 8 - 28 mg/dL    Creatinine 0.99 0.60 - 1.10 mg/dL    GFR MDRD Af Amer >60 >60 mL/min/1.73m2    GFR MDRD Non Af Amer 53 (L) >60 mL/min/1.73m2       Lab Results   Component Value Date    HGB 8.6 (L) 11/22/2017       Assessment/Plan:  1. Left sacral and pubic fractures. Sustained Oct 2107, initially conservative management, now s/p operative repair 11/14/17. Follow up with ortho.  2. Afib. Anticoagulated with coumadin. Metoprolol for rate control.   3. Interstitial lung disease. Not on oxygen. No complaints of SOB.  4. RA and PMR. On chronic prednisone.  5. Hx diastolic HF. Cont diuretics. Not in exacerbation.  6. Code status is DNR/DNI.    Total time greater than 40 minutes, greater than 50% counseling and coordination of care, time spent in interview and examination of patient, review of records, discussion with nursing staff.        Electronically signed by: Lala Silva MD

## 2021-06-14 NOTE — PROGRESS NOTES
[facility]    Facility:   [facility] [[id number]]   Code Status: DNR      CHIEF COMPLAINT/REASON FOR VISIT:  Chief Complaint   Patient presents with     Follow Up     sacrum       HISTORY:      HPI: Gladys is a 87 y.o. female who I had a chance to visit with secondary to her hospitalization October 11 - October 18, 2017 secondary to a fall sustaining a left sacral fracture and a pubic ramus fracture.  Recently did see orthopedics and they did recommend repair she was debating that she did go and then the thought that they could do this very successfully so apparently she is planning on getting that performed sometime in the near future.  Otherwise currently enrolled in therapy with the therapy department on the TCU to try to improve her strength and overall conditioning.  For pain she is on both oxycodone every 4 hours 5 mg but also Tylenol thousand milligrams 3 times a day.  Recently did end up having some lower extremity edema she Alberto is on furosemide she does not feel as any congestive heart failure issue recently had a BMP.  Her weight did jump up to 143 pounds apparently did weigh 139 a few days ago.  Normotensive and afebrile.  She also is on Coumadin were making most adjustments.  Appetite good.  Getting extra nutrient supplements.    Past Medical History:   Diagnosis Date     Atrial fibrillation      Fracture of multiple pubic rami      HTN (hypertension)      Interstitial lung disease      Osteoporosis      PMR (polymyalgia rheumatica)      RA (rheumatoid arthritis)      Sacral fracture              Family History   Problem Relation Age of Onset     Melanoma Mother      Cataracts Mother      Hypertension Father      Glaucoma Neg Hx      Macular degeneration Neg Hx      Retinal detachment Neg Hx      Social History     Social History     Marital status:      Spouse name: N/A     Number of children: N/A     Years of education: N/A     Social History  Main Topics     Smoking status: Never Smoker     Smokeless tobacco: Never Used     Alcohol use Yes     Drug use: No     Sexual activity: Yes     Partners: Male     Other Topics Concern     Not on file     Social History Narrative     No narrative on file         Review of Systems  Currently she does deny chills fever coughing wheezing chest pain dizziness vertigo nausea vomiting diarrhea dysuria rashes or sores.  A history of diastolic CHF A. fib hypertension protein malnutrition chronic leukocytosis rheumatoid arthritis      Current Outpatient Prescriptions:      acetaminophen (TYLENOL) 500 MG tablet, Take 1,000 mg by mouth 3 (three) times a day., Disp: , Rfl:      furosemide (LASIX) 20 MG tablet, Take 60 mg by mouth every morning., Disp: , Rfl:      furosemide (LASIX) 40 MG tablet, Take 40 mg by mouth daily. Afternoon, Disp: , Rfl:      metoprolol succinate (TOPROL-XL) 100 MG 24 hr tablet, Take 250 mg by mouth daily., Disp: , Rfl:      oxyCODONE (ROXICODONE) 5 MG immediate release tablet, Take 5 mg by mouth every 4 (four) hours. , Disp: , Rfl:      polyethylene glycol (MIRALAX) 17 gram packet, Take 17 g by mouth daily., Disp: , Rfl:      potassium chloride SA (K-DUR,KLOR-CON) 20 MEQ tablet, Take 20 mEq by mouth 2 (two) times a day., Disp: , Rfl:      predniSONE (DELTASONE) 10 mg tablet, Take 10 mg by mouth daily., Disp: , Rfl:      senna (SENOKOT) 8.6 mg tablet, Take 1 tablet by mouth daily., Disp: , Rfl:      WARFARIN SODIUM (WARFARIN ORAL), Take by mouth. 4 mg every Mon, Wed, Fri. 3 mg all other days. Recheck INR 10/26, Disp: , Rfl:     .There were no vitals filed for this visit.  Blood pressure 106/62 pulse 75 respirations 18 temperature 97.6  Physical Exam  Constitutional: She is oriented to person, place, and time. She appears well-nourished. No distress.   HENT:   Head: Normocephalic.   Eyes:   Glasses.   Neck: Neck supple. No thyromegaly present.   Cardiovascular:   Irregularity.  HELENE hose.  No significant  edema.   Pulmonary/Chest: Breath sounds normal.   Abdominal:  There is no tenderness. There is no guarding.   Musculoskeletal:   History of rheumatoid arthritis.  Multiple joints.  Including the hands.  Left sacral fracture.   Lymphadenopathy:     She has no cervical adenopathy.   Neurological: She is oriented to person, place, and time.   Skin: Skin is warm and dry. No rash noted.            LABS:   Lab Results   Component Value Date    WBC 16.3 (H) 07/10/2017    HGB 13.9 07/10/2017    HCT 44.0 07/10/2017    MCV 93 07/10/2017     07/10/2017     Results for orders placed or performed in visit on 10/26/17   Basic Metabolic Panel   Result Value Ref Range    Sodium 143 136 - 145 mmol/L    Potassium 4.1 3.5 - 5.0 mmol/L    Chloride 105 98 - 107 mmol/L    CO2 27 22 - 31 mmol/L    Anion Gap, Calculation 11 5 - 18 mmol/L    Glucose 69 (L) 70 - 125 mg/dL    Calcium 8.9 8.5 - 10.5 mg/dL    BUN 31 (H) 8 - 28 mg/dL    Creatinine 0.99 0.60 - 1.10 mg/dL    GFR MDRD Af Amer >60 >60 mL/min/1.73m2    GFR MDRD Non Af Amer 53 (L) >60 mL/min/1.73m2           ASSESSMENT:      ICD-10-CM    1. Gait difficulty R26.9    2. Sacral fracture S32.10XA    3. Hypertension I10    4. Leg edema R60.0        PLAN:    At this time continue to manage and follow.  She will keep me updated with how she is doing and what her decisions are and if she is planning on having surgery or not otherwise continue to work with the therapy department and she would also like to continue with her current pain medications.        Electronically signed by: Michael Duane Johnson, CNP

## 2021-06-14 NOTE — PROGRESS NOTES
[facility]    Facility:   [facility] [[id number]]   Code Status: DNR      CHIEF COMPLAINT/REASON FOR VISIT:  Chief Complaint   Patient presents with     Follow Up     sacral fx       HISTORY:      HPI: Gladys is a 87 y.o. female who had a chance to visit with again secondary to her hospitalization October 11 - October 18, 2017 secondary to fall sustaining a left sacral fracture and a pubic ramus fracture.  She does see orthopedics today and they she is going to get a CAT scan on November 8.  At this point she is not sure about what intervention she really does not want to have any further intervention including a nailing to the sacrum but she will listen to her options.  For pain she is on Tylenol 3 times a day does have oxycodone 5 mg every 4 hours scheduled to like to stay with her regimen at this time.  Does have a history of hypertension and CAD is on furosemide.  We will recheck her BMP due to the diuretics.  She also is on warfarin we will recheck that and make adjustments to her warfarin pending her INR.  Her appetite is good.  Getting extra house nutrient supplements.  Bowels and bladder are working fine.  Sleeping well at night.  Is participating in therapy.    Past Medical History:   Diagnosis Date     Atrial fibrillation      Fracture of multiple pubic rami      HTN (hypertension)      Interstitial lung disease      Osteoporosis      PMR (polymyalgia rheumatica)      RA (rheumatoid arthritis)      Sacral fracture              Family History   Problem Relation Age of Onset     Melanoma Mother      Cataracts Mother      Hypertension Father      Glaucoma Neg Hx      Macular degeneration Neg Hx      Retinal detachment Neg Hx      Social History     Social History     Marital status:      Spouse name: N/A     Number of children: N/A     Years of education: N/A     Social History Main Topics     Smoking status: Never Smoker     Smokeless tobacco: Never  Used     Alcohol use Yes     Drug use: No     Sexual activity: Yes     Partners: Male     Other Topics Concern     Not on file     Social History Narrative     No narrative on file         Review of Systems  Currently she does deny chills fever coughing wheezing chest pain dizziness vertigo nausea vomiting diarrhea dysuria rashes or sores.  A history of diastolic CHF A. fib hypertension protein malnutrition chronic leukocytosis rheumatoid arthritis        Current Outpatient Prescriptions   Medication Sig     acetaminophen (TYLENOL) 500 MG tablet Take 1,000 mg by mouth 3 (three) times a day.     furosemide (LASIX) 20 MG tablet Take 60 mg by mouth every morning.     furosemide (LASIX) 40 MG tablet Take 40 mg by mouth daily. Afternoon     metoprolol succinate (TOPROL-XL) 100 MG 24 hr tablet Take 250 mg by mouth daily.     oxyCODONE (ROXICODONE) 5 MG immediate release tablet Take 5 mg by mouth every 4 (four) hours.      polyethylene glycol (MIRALAX) 17 gram packet Take 17 g by mouth daily.     potassium chloride SA (K-DUR,KLOR-CON) 20 MEQ tablet Take 20 mEq by mouth 2 (two) times a day.     predniSONE (DELTASONE) 10 mg tablet Take 10 mg by mouth daily.     senna (SENOKOT) 8.6 mg tablet Take 1 tablet by mouth daily.     WARFARIN SODIUM (WARFARIN ORAL) Take by mouth. 4 mg every Mon, Wed, Fri. 3 mg all other days. Recheck INR 10/26       .There were no vitals filed for this visit.  Blood pressure 120/70 pulse 80 respirations 18 temperature 97.4  Physical Exam  Constitutional: She is oriented to person, place, and time. She appears well-nourished. No distress.   HENT:   Head: Normocephalic.   Eyes:   Glasses.   Neck: Neck supple. No thyromegaly present.   Cardiovascular:   Irregularity.  HELENE hose.  No significant edema.   Pulmonary/Chest: Breath sounds normal.   Abdominal:  There is no tenderness. There is no guarding.   Musculoskeletal:   History of rheumatoid arthritis.  Multiple joints.  Including the hands.  Left sacral  fracture.   Lymphadenopathy:     She has no cervical adenopathy.   Neurological: She is oriented to person, place, and time.   Skin: Skin is warm and dry. No rash noted.          LABS:   Results for orders placed or performed in visit on 10/26/17   Basic Metabolic Panel   Result Value Ref Range    Sodium 143 136 - 145 mmol/L    Potassium 4.1 3.5 - 5.0 mmol/L    Chloride 105 98 - 107 mmol/L    CO2 27 22 - 31 mmol/L    Anion Gap, Calculation 11 5 - 18 mmol/L    Glucose 69 (L) 70 - 125 mg/dL    Calcium 8.9 8.5 - 10.5 mg/dL    BUN 31 (H) 8 - 28 mg/dL    Creatinine 0.99 0.60 - 1.10 mg/dL    GFR MDRD Af Amer >60 >60 mL/min/1.73m2    GFR MDRD Non Af Amer 53 (L) >60 mL/min/1.73m2           ASSESSMENT:      ICD-10-CM    1. Sacral fracture S32.10XA    2. Gait difficulty R26.9    3. Hypertension I10    4. Protein malnutrition E46        PLAN:    Due to the diuretics we will recheck her BNP on November 10.  Otherwise continue with current medications and treatments.  She does have a CAT scan scheduled for tomorrow.        Electronically signed by: Michael Duane Johnson, CNP

## 2021-06-16 PROBLEM — M06.9 RA (RHEUMATOID ARTHRITIS) (H): Status: ACTIVE | Noted: 2017-10-24

## 2021-06-16 PROBLEM — I50.9 CHF (CONGESTIVE HEART FAILURE) (H): Status: ACTIVE | Noted: 2017-10-24

## 2021-06-16 PROBLEM — R26.9 GAIT DIFFICULTY: Status: ACTIVE | Noted: 2017-11-07

## 2021-06-16 PROBLEM — Z86.2 HX OF LEUKOCYTOSIS: Status: ACTIVE | Noted: 2017-10-24

## 2021-06-16 PROBLEM — I48.91 A-FIB (H): Status: ACTIVE | Noted: 2017-10-24

## 2021-06-16 PROBLEM — E46 PROTEIN MALNUTRITION (H): Status: ACTIVE | Noted: 2017-10-24

## 2021-06-16 PROBLEM — S32.10XA SACRAL FRACTURE (H): Status: ACTIVE | Noted: 2017-10-24

## 2021-06-16 PROBLEM — I10 HYPERTENSION: Status: ACTIVE | Noted: 2017-10-24
